# Patient Record
Sex: MALE | Race: WHITE
[De-identification: names, ages, dates, MRNs, and addresses within clinical notes are randomized per-mention and may not be internally consistent; named-entity substitution may affect disease eponyms.]

---

## 2020-03-10 ENCOUNTER — HOSPITAL ENCOUNTER (EMERGENCY)
Dept: HOSPITAL 56 - MW.ED | Age: 30
LOS: 1 days | Discharge: HOME | End: 2020-03-11
Payer: COMMERCIAL

## 2020-03-10 DIAGNOSIS — J45.901: Primary | ICD-10-CM

## 2020-03-10 DIAGNOSIS — F17.210: ICD-10-CM

## 2020-03-10 PROCEDURE — 99284 EMERGENCY DEPT VISIT MOD MDM: CPT

## 2020-03-11 VITALS — SYSTOLIC BLOOD PRESSURE: 143 MMHG | HEART RATE: 98 BPM | DIASTOLIC BLOOD PRESSURE: 74 MMHG

## 2020-03-11 NOTE — EDM.PDOC
ED HPI GENERAL MEDICAL PROBLEM





- General


Chief Complaint: Respiratory Problem


Stated Complaint: ASTHMA ATTACK


Time Seen by Provider: 03/11/20 00:19





- History of Present Illness


INITIAL COMMENTS - FREE TEXT/NARRATIVE: 


29-year-old male presents with wheezing and coughing for about 1 week.  And 

then 24 hours ago he left his rescue inhaler and his company truck and has had 

much worse time breathing today.  He denies any chest pain or fevers.  Denies 

any weakness or numbness or confusion vision changes headaches changes in bowel 

or bladder habits.  Patient denies any incoordination.  He because of symptoms 

moderate to severe.  He does not currently have a doctor.  His wife was 

recently sick with a flulike illness and fevers.





  ** chest area


Pain Score (Numeric/FACES): 6





- Related Data


 Allergies











Allergy/AdvReac Type Severity Reaction Status Date / Time


 


No Known Allergies Allergy   Verified 03/10/20 23:53











Home Meds: 


 Home Meds





Albuterol Sulfate [Albuterol Sulfate Hfa] 1 inh IH ASDIRECTED PRN 03/10/20 [

History]


Albuterol Sulfate [Albuterol Sulfate Hfa] 18 gm IH Q6HR #1 hfa.aer.ad 03/11/20 [

Rx]


predniSONE [Prednisone] 50 mg PO DAILY 4 Days #4 tablet 03/11/20 [Rx]











Past Medical History





- Past Health History


Medical/Surgical History: Denies Medical/Surgical History


HEENT History: Reports: None


Cardiovascular History: Reports: None


Respiratory History: Reports: Asthma


Gastrointestinal History: Reports: None


Genitourinary History: Reports: None


Musculoskeletal History: Reports: None


Neurological History: Reports: None


Psychiatric History: Reports: None


Endocrine/Metabolic History: Reports: None


Insulin Pump Model and : N/A


Hematologic History: Reports: None


Immunologic History: Reports: None


Oncologic (Cancer) History: Reports: None


Dermatologic History: Reports: None





- Infectious Disease History


Infectious Disease History: Reports: None





- Past Surgical History


Head Surgeries/Procedures: Reports: None


Male  Surgical History: Reports: None


Musculoskeletal Surgical History: Reports: ORIF


Other Musculoskeletal Surgeries/Procedures:: yue in left leg





Social & Family History





- Family History


Family Medical History: Noncontributory





- Tobacco Use


Smoking Status *Q: Current Every Day Smoker


Years of Tobacco use: 15


Packs/Tins Daily: 0.5





- Caffeine Use


Caffeine Use: Reports: Coffee





- Recreational Drug Use


Recreational Drug Use: No





ED ROS GENERAL





- Review of Systems


Review Of Systems: Comprehensive ROS is negative, except as noted in HPI.





ED EXAM, GENERAL





- Physical Exam


Exam: See Below


Free Text/Narrative:: 


General: No acute distress.  Comfortable.


Heent: Examination revealed no pallor, no icterus, no lymphadenopathy.  The 

patient has normal posterior pharynx, moist mucous membranes.


Neck: Supple. No JVD. No rigidity.


Heart: Normal rate.   Reg rhythm.  No murmurs appreciated.


Lungs: Poor movement at the bases.  Delayed exhalation phase.  Significant 

widespread wheezing..  No focal findings.


Abdomen: Nontender, non-distended, soft, no CVA tenderness.


Neuro: Pt is moving all four extremities.  EOMI.  PERRL.  Normal speech.


Skin: Exposed areas appeared normally perfused, warm, normal color with no 

meaningful rashes or lesions.


Extremities: Peripheral examination revealed no pedal edema. Peripheral pulses 

were 2+.








Course





- Vital Signs


Text/Narrative:: 





Patient here with reasonable saturations after his first treatment.  We will 

repeat the treatments and the patient on the steroids and cough suppressant.  

Return to emergency with any worsening.  Establish care in the community.


Last Recorded V/S: 


 Last Vital Signs











Temp  96.4 F L  03/10/20 23:50


 


Pulse  98   03/11/20 01:07


 


Resp  20   03/11/20 01:07


 


BP  143/74 H  03/11/20 01:07


 


Pulse Ox  94 L  03/11/20 01:07














- Orders/Labs/Meds


Meds: 


Medications














Discontinued Medications














Generic Name Dose Route Start Last Admin





  Trade Name Mishel  PRN Reason Stop Dose Admin


 


Albuterol/Ipratropium  Confirm  03/10/20 23:50  03/11/20 00:03





  Duoneb 3.0-0.5 Mg/3 Ml  Administered  03/10/20 23:51  Not Given





  Dose   





  3 ml   





  .ROUTE   





  .STK-MED ONE   





     





     





     





     


 


Albuterol/Ipratropium  3 ml  03/10/20 23:55  03/11/20 00:03





  Duoneb 3.0-0.5 Mg/3 Ml  NEB  03/10/20 23:56  3 ml





  ONETIME ONE   Administration





     





     





     





     


 


Albuterol/Ipratropium  3 ml  03/11/20 00:22  03/11/20 00:33





  Duoneb 3.0-0.5 Mg/3 Ml  NEB  03/11/20 00:23  3 ml





  ONETIME ONE   Administration





     





     





     





     


 


Prednisone  40 mg  03/11/20 00:24  03/11/20 00:32





  Prednisone  PO  03/11/20 00:25  40 mg





  ONETIME ONE   Administration





     





     





     





     














Departure





- Departure


Time of Disposition: 01:25


Disposition: Home, Self-Care 01


Condition: Good


Clinical Impression: 


 Asthma exacerbation








- Discharge Information


Prescriptions: 


Albuterol Sulfate [Albuterol Sulfate Hfa] 18 gm IH Q6HR #1 hfa.aer.ad


predniSONE [Prednisone] 50 mg PO DAILY 4 Days #4 tablet


Instructions:  Asthma, Adult, How to Use a Metered Dose Inhaler


Referrals: 


PCP,None [Primary Care Provider] - 


Forms:  ED Department Discharge


Additional Instructions: 


Take your steroids.  Use your inhaler as needed.  You need to follow-up with a 

primary care physician.  Some physicians are listed below.  You can discuss 

smoking cessation with him as well.  Return to emergency with any worsening.








[facility] - Primary Care


[address] [place] [address]


Phone: [phone]


Fax: [phone]





[place] [address]


Phone: [phone]


Fax: [phone]





The following information is given to patients seen in the emergency department 

who are being discharged to home. This information is to outline your options 

for follow-up care. We provide all patients seen in our emergency department 

with a follow-up referral.





The need for follow-up, as well as the timing and circumstances, are variable 

depending upon the specifics of your emergency department visit.





If you don't have a primary care physician on staff, we will provide you with a 

referral. We always advise you to contact your personal physician following an 

emergency department visit to inform them of the circumstance of the visit and 

for follow-up with them and/or the need for any referrals to a consulting 

specialist.





The emergency department will also refer you to a specialist when appropriate. 

This referral assures that you have the opportunity for follow-up care with a 

specialist. All of these measure are taken in an effort to provide you with 

optimal care, which includes your follow-up.





Under all circumstances we always encourage you to contact your private 

physician who remains a resource for coordinating your care. When calling for 

follow-up care, please make the office aware that this follow-up is from your 

recent emergency room visit. If for any reason you are refused follow-up, 

please contact the [facility] Emergency 

Department at [phone] and asked to speak to the emergency department 

charge nurse.














Sepsis Event Note





- Evaluation


Sepsis Screening Result: No Definite Risk





- Focused Exam


Date Exam was Performed: 03/12/20


Time Exam was Performed: 02:50

## 2020-04-14 ENCOUNTER — HOSPITAL ENCOUNTER (EMERGENCY)
Dept: HOSPITAL 56 - MW.ED | Age: 30
Discharge: HOME | End: 2020-04-14
Payer: COMMERCIAL

## 2020-04-14 VITALS — DIASTOLIC BLOOD PRESSURE: 80 MMHG | SYSTOLIC BLOOD PRESSURE: 150 MMHG

## 2020-04-14 DIAGNOSIS — M54.5: Primary | ICD-10-CM

## 2020-04-14 DIAGNOSIS — J45.909: ICD-10-CM

## 2020-04-14 PROCEDURE — 96372 THER/PROPH/DIAG INJ SC/IM: CPT

## 2020-04-14 PROCEDURE — 99283 EMERGENCY DEPT VISIT LOW MDM: CPT

## 2020-04-14 PROCEDURE — 72100 X-RAY EXAM L-S SPINE 2/3 VWS: CPT

## 2020-04-14 NOTE — EDM.PDOC
ED HPI GENERAL MEDICAL PROBLEM





- General


Chief Complaint: Back Pain or Injury


Stated Complaint: LOWER BACK PAIN


Time Seen by Provider: 04/14/20 10:55


Source of Information: Reports: Patient


History Limitations: Reports: No Limitations





- History of Present Illness


INITIAL COMMENTS - FREE TEXT/NARRATIVE: 


HISTORY AND PHYSICAL:





History of present illness:


Patient is a 29-year-old male who presents to the ED today with concern of low 

back pain that has worsened this morning when he woke up.  Patient states he 

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 

last night.  Patient states when he woke up this morning his low back was more 

stiff and he had a hard time bending over to put on his shoes this morning. 

Patient denies any direct trauma or injury to his back or loss/retention of 

bowel and bladder function or saddle anesthesia.





Patient denies fever, chills, chest pain, shortness of breath, or cough. Denies 

headache, neck stiff ness, change in vision, syncope, or near syncope. Denies 

nausea, vomiting, abdominal pain, diarrhea, constipation, or dysuria. Has not 

noted any blood in urine or stool. Patient has been eating and drinking 

appropriately.





Review of systems: 


As per history of present illness and below otherwise all systems reviewed and 

negative.





Past medical history: 


As per history of present illness and as reviewed below otherwise 

noncontributory.





Surgical history: 


As per history of present illness and as reviewed below otherwise 

noncontributory.





Social history: 


See social history for further information





Family history: 


As per history of present illness and as reviewed below otherwise 

noncontributory.





Physical exam:


General: Patient is alert, oriented, and in no acute distress. Patient sitting 

comfortably on exam table.


HEENT: Atraumatic, normocephalic, pupils equal and reactive bilaterally, 

negative for conjunctival pallor or scleral icterus, mucous membranes moist, 

TMs normal bilaterally, throat clear, neck supple, nontender, trachea midline. 

No drooling or trismus noted. No meningeal signs. No hot potato voice noted. 


Lungs: Clear to auscultation, breath sounds equal bilaterally, chest nontender.


Heart: S1S2, regular rate and rhythm without overt murmur


Abdomen: Soft, nondistended, nontender. Negative for masses or 

hepatosplenomegaly. Negative for costovertebral tenderness.


Pelvis: Stable nontender.


Genitourinary: Deferred.


Rectal: Deferred.


Skin: Intact, warm, dry. No lesions or rashes noted.


Extremities/musculoskeletal: No obvious deformity of the complete spine.  No 

step-offs, crepitus, or point tenderness to palpation of the complete spine.  

Patient does have mild discomfort with palpation of the paraspinous muscles of 

the lumbar spine.  Patient does have full range of motion of complete spine 

with some pain with range of motion of the lumbar spine.  Otherwise, atraumatic

, negative for cords or calf pain. Neurovascular unremarkable.


Neuro: Awake, alert, oriented. Cranial nerves II through XII unremarkable. 

Cerebellum unremarkable. Motor and sensory unremarkable throughout. Exam 

nonfocal.





Notes:


Discussed importance for follow-up with primary care provider.


Voices understanding and is agreeable to plan of care. Denies any further 

questions or concerns at this time.





Diagnostics:


Lumbar XR





Therapeutics:


Toradol, Norflex





Prescription:


Diclofenac, Flexeril





Impression: 


Low back pain





Plan:


1.  The medication you received today does cause drowsiness, so do not drive 

for the remaining day.


2.  When resting please lay on a flat firm surface.  Limit your mobility to 

prevent muscle stiffness.  Get up to ambulate/move around/gentle stretching 

multiple times throughout the day.  May alternate heat and ice to painful areas.


3.  Tylenol as needed for back pain.  Otherwise, take the prescribed Flexeril 

and diclofenac as directed.  Diclofenac as an anti-inflammatory medication so 

do not take any additional NSAIDs with this medication, such as naproxen, 

ibuprofen, or Aleve.  Flexeril, this medication may cause drowsiness, so do not 

take it while driving or needing to be functioning outside of the home.


4.  Follow-up with your primary care provider as discussed.  Return to the ED 

as needed and as discussed.








Definitive disposition and diagnosis as appropriate pending reevaluation and 

review of above.





  ** Lower Back


Pain Score (Numeric/FACES): 4





- Related Data


 Allergies











Allergy/AdvReac Type Severity Reaction Status Date / Time


 


No Known Allergies Allergy   Verified 03/10/20 23:53











Home Meds: 


 Home Meds





Albuterol Sulfate [Albuterol Sulfate Hfa] 1 inh IH ASDIRECTED PRN 03/10/20 [

History]


Albuterol Sulfate [Albuterol Sulfate Hfa] 18 gm IH Q6HR #1 hfa.aer.ad 03/11/20 [

Rx]


Cyclobenzaprine [Flexeril] 10 mg PO TID PRN #9 tab 04/14/20 [Rx]


Diclofenac Sodium [Voltaren] 75 mg PO BIDMEALS PRN #15 tab.cr 04/14/20 [Rx]











Past Medical History





- Past Health History


Medical/Surgical History: Denies Medical/Surgical History


HEENT History: Reports: None


Cardiovascular History: Reports: None


Respiratory History: Reports: Asthma


Gastrointestinal History: Reports: None


Genitourinary History: Reports: None


Musculoskeletal History: Reports: None


Neurological History: Reports: None


Psychiatric History: Reports: None


Endocrine/Metabolic History: Reports: None


Insulin Pump Model and : N/A


Hematologic History: Reports: None


Immunologic History: Reports: None


Oncologic (Cancer) History: Reports: None


Dermatologic History: Reports: None





- Infectious Disease History


Infectious Disease History: Reports: None





- Past Surgical History


Head Surgeries/Procedures: Reports: None


Male  Surgical History: Reports: None


Musculoskeletal Surgical History: Reports: ORIF


Other Musculoskeletal Surgeries/Procedures:: yue in left leg





Social & Family History





- Family History


Family Medical History: Noncontributory





- Tobacco Use


Smoking Status *Q: Never Smoker


Second Hand Smoke Exposure: No





- Caffeine Use


Caffeine Use: Reports: Coffee





- Recreational Drug Use


Recreational Drug Use: No





ED ROS GENERAL





- Review of Systems


Review Of Systems: Comprehensive ROS is negative, except as noted in HPI.





ED EXAM, GENERAL





- Physical Exam


Exam: See Below (see dictation)





Course





- Vital Signs


Last Recorded V/S: 


 Last Vital Signs











Temp  97.4 F   04/14/20 11:04


 


Pulse      


 


Resp  16   04/14/20 11:04


 


BP  150/80 H  04/14/20 11:04


 


Pulse Ox  98   04/14/20 11:04














- Orders/Labs/Meds


Meds: 


Medications














Discontinued Medications














Generic Name Dose Route Start Last Admin





  Trade Name Freq  PRN Reason Stop Dose Admin


 


Ketorolac Tromethamine  60 mg  04/14/20 11:13  04/14/20 11:34





  Toradol  IM  04/14/20 11:14  60 mg





  ONETIME ONE   Administration





     





     





     





     


 


Orphenadrine Citrate  60 mg  04/14/20 11:13  04/14/20 11:34





  Norflex  IM  04/14/20 11:14  60 mg





  ONETIME ONE   Administration





     





     





     





     














Departure





- Departure


Time of Disposition: 12:10


Disposition: Home, Self-Care 01


Clinical Impression: 


Low back pain


Qualifiers:


 Chronicity: acute Back pain laterality: bilateral Sciatica presence: without 

sciatica Qualified Code(s): M54.5 - Low back pain








- Discharge Information


Prescriptions: 


Cyclobenzaprine [Flexeril] 10 mg PO TID PRN #9 tab


 PRN Reason: Spasms


Diclofenac Sodium [Voltaren] 75 mg PO BIDMEALS PRN #15 tab.cr


 PRN Reason: Pain


Referrals: 


PCP,None [Primary Care Provider] - 


Forms:  ED Department Discharge


Additional Instructions: 


The following information is given to patients seen in the emergency department 

who are being discharged to home. This information is to outline your options 

for follow-up care. We provide all patients seen in our emergency department 

with a follow-up referral.





The need for follow-up, as well as the timing and circumstances, are variable 

depending upon the specifics of your emergency department visit.





If you don't have a primary care physician on staff, we will provide you with a 

referral. We always advise you to contact your personal physician following an 

emergency department visit to inform them of the circumstance of the visit and 

for follow-up with them and/or the need for any referrals to a consulting 

specialist.





The emergency department will also refer you to a specialist when appropriate. 

This referral assures that you have the opportunity for follow-up care with a 

specialist. All of these measure are taken in an effort to provide you with 

optimal care, which includes your follow-up.





Under all circumstances we always encourage you to contact your private 

physician who remains a resource for coordinating your care. When calling for 

follow-up care, please make the office aware that this follow-up is from your 

recent emergency room visit. If for any reason you are refused follow-up, 

please contact the [facility] Emergency 

Department at [phone] and asked to speak to the emergency department 

charge nurse.





[facility]


Primary Care


[address] [place] [address]


Phone: [phone]


Fax: [phone]





[facility]


[address] [place] [address]


Phone: [phone]


Fax: [phone]








1.  The medication you received today does cause drowsiness, so do not drive 

for the remaining day.


2.  When resting please lay on a flat firm surface.  Limit your mobility to 

prevent muscle stiffness.  Get up to ambulate/move around/gentle stretching 

multiple times throughout the day.  May alternate heat and ice to painful areas.


3.  Tylenol as needed for back pain.  Otherwise, take the prescribed Flexeril 

and diclofenac as directed.  Diclofenac as an anti-inflammatory medication so 

do not take any additional NSAIDs with this medication, such as naproxen, 

ibuprofen, or Aleve.  Flexeril, this medication may cause drowsiness, so do not 

take it while driving or needing to be functioning outside of the home.


4.  Follow-up with your primary care provider as discussed.  Return to the ED 

as needed and as discussed.


 











Sepsis Event Note





- Evaluation


Sepsis Screening Result: No Definite Risk





- Focused Exam


Vital Signs: 


 Vital Signs











  Temp Resp BP Pulse Ox


 


 04/14/20 11:04  97.4 F  16  150/80 H  98











Date Exam was Performed: 04/14/20


Time Exam was Performed: 12:10

## 2020-04-14 NOTE — CR
Lumbar spine: AP, lateral and coned-down lateral view centered to the 

lumbosacral junction were obtained.

 

Slight posterior disc space narrowing is noted at L3-L4 and L4-L5.  

Other disc spaces are preserved.  Vertebral body heights are 

maintained.  Pedicles are intact.  Transverse and spinous processes 

are intact.  Minimal scoliosis is noted.

 

Impression:

1.  Slight scoliosis and minimal degenerative change.

 

Diagnostic code #2

 

Study was dictated in MDT

## 2020-05-27 ENCOUNTER — HOSPITAL ENCOUNTER (EMERGENCY)
Dept: HOSPITAL 56 - MW.ED | Age: 30
End: 2020-05-27
Payer: COMMERCIAL

## 2020-05-27 DIAGNOSIS — I46.9: Primary | ICD-10-CM

## 2020-05-27 DIAGNOSIS — J45.909: ICD-10-CM

## 2020-05-27 NOTE — EDM.PDOC
ED HPI GENERAL MEDICAL PROBLEM





- General


Chief Complaint: CPR in Progress


Stated Complaint: CODE BLUE


Time Seen by Provider: 20 07:27





- History of Present Illness


INITIAL COMMENTS - FREE TEXT/NARRATIVE: 





History of present illness:


[Patient was found down at home by a roommate this morning unresponsive EMS was 

called on arrival EMS found the patient to be asystolic pulseless apneic and 

without any signs of life.  Per protocol they contacted the med control 

physician who instructed them to proceed with resuscitation and transport to 

the hospital in spite of unknown downtime and asystole on the monitor.  The 

transport time was greater than 30 minutes no spontaneous signs of life no 

return to spontaneous circulation was obtained on arrival the patient is apneic 

pulseless asystole on the monitor patient had received continuous CPR more than 

7 rounds of epinephrine and Narcan and amiodarone with no effect.  He had been 

intubated in the field.]  Patient has a history of drug abuse.





Review of systems: 


As per history of present illness and below otherwise all systems reviewed and 

negative.





Past medical history: 


As per history of present illness and as reviewed below otherwise 

noncontributory.





Surgical history: 


As per history of present illness and as reviewed below otherwise 

noncontributory.





Social history: 


No reported history of drug or alcohol abuse.





Family history: 


As per history of present illness and as reviewed below otherwise 

noncontributory.





Physical exam:


HEENT: Atraumatic, normocephalic, pupils are unreactive Hema airway in place


Lungs: Clear to auscultation, breath sounds equal bilaterally.  


Heart: Asystole on the monitor no heart tones


Abdomen: Distended


Pelvis: Stable 


Genitourinary: Deferred.


Rectal: Deferred.


Extremities: Atraumatic


Neuro: GCS 3


Diagnostics:


[]





Therapeutics:


[]





Impression: Full arrest


[]





Plan:


[]





Definitive disposition and diagnosis as appropriate pending reevaluation and 

review of above.








- Related Data


 Allergies











Allergy/AdvReac Type Severity Reaction Status Date / Time


 


No Known Allergies Allergy   Unverified 20 07:27











Home Meds: 


 Home Meds





Albuterol Sulfate [Albuterol Sulfate Hfa] 1 inh IH ASDIRECTED PRN 03/10/20 [

History]


Albuterol Sulfate [Albuterol Sulfate Hfa] 18 gm IH Q6HR #1 hfa.aer.ad 20 [

Rx]


Cyclobenzaprine [Flexeril] 10 mg PO TID PRN #9 tab 20 [Rx]


Diclofenac Sodium [Voltaren] 75 mg PO BIDMEALS PRN #15 tab.cr 20 [Rx]











Past Medical History





- Past Health History


Medical/Surgical History: Denies Medical/Surgical History


HEENT History: Reports: None


Cardiovascular History: Reports: None


Respiratory History: Reports: Asthma


Gastrointestinal History: Reports: None


Genitourinary History: Reports: None


Musculoskeletal History: Reports: None


Neurological History: Reports: None


Psychiatric History: Reports: None


Endocrine/Metabolic History: Reports: None


Insulin Pump Model and : N/A


Hematologic History: Reports: None


Immunologic History: Reports: None


Oncologic (Cancer) History: Reports: None


Dermatologic History: Reports: None





- Infectious Disease History


Infectious Disease History: Reports: None





- Past Surgical History


Head Surgeries/Procedures: Reports: None


Male  Surgical History: Reports: None


Musculoskeletal Surgical History: Reports: ORIF


Other Musculoskeletal Surgeries/Procedures:: yue in left leg





Social & Family History





- Family History


Family Medical History: Noncontributory





- Caffeine Use


Caffeine Use: Reports: Coffee





ED ROS GENERAL





- Review of Systems


Review Of Systems: Unable To Obtain


Reason Not Obtained: patient 





ED EXAM, GENERAL





- Physical Exam


Exam: See Below





Course





- Vital Signs


Text/Narrative:: 





Patient presented to the ED in asystole with extensive prior CPR and 

resuscitative efforts these efforts were terminated upon confirmation that the 

patient was still in asystole unknown downtime and no signs of pulse or 

spontaneous circulation were evident.





Departure





- Departure


Time of Disposition: 07:34


Disposition:  20


Preliminary Cause of Death *Q: Cardiac Arrest


Clinical Impression: 


 Cardiac arrest








- Discharge Information


Referrals: 


PCP,None [Primary Care Provider] -